# Patient Record
Sex: MALE | Race: WHITE | NOT HISPANIC OR LATINO | Employment: FULL TIME | URBAN - METROPOLITAN AREA
[De-identification: names, ages, dates, MRNs, and addresses within clinical notes are randomized per-mention and may not be internally consistent; named-entity substitution may affect disease eponyms.]

---

## 2017-01-27 ENCOUNTER — ALLSCRIPTS OFFICE VISIT (OUTPATIENT)
Dept: OTHER | Facility: OTHER | Age: 22
End: 2017-01-27

## 2018-01-14 VITALS
BODY MASS INDEX: 21.82 KG/M2 | DIASTOLIC BLOOD PRESSURE: 74 MMHG | WEIGHT: 144 LBS | SYSTOLIC BLOOD PRESSURE: 112 MMHG | HEIGHT: 68 IN | HEART RATE: 51 BPM | OXYGEN SATURATION: 98 %

## 2018-07-30 ENCOUNTER — OFFICE VISIT (OUTPATIENT)
Dept: FAMILY MEDICINE CLINIC | Facility: CLINIC | Age: 23
End: 2018-07-30
Payer: COMMERCIAL

## 2018-07-30 VITALS
BODY MASS INDEX: 20.92 KG/M2 | HEIGHT: 68 IN | OXYGEN SATURATION: 98 % | SYSTOLIC BLOOD PRESSURE: 112 MMHG | TEMPERATURE: 98.1 F | HEART RATE: 64 BPM | DIASTOLIC BLOOD PRESSURE: 70 MMHG | WEIGHT: 138 LBS

## 2018-07-30 DIAGNOSIS — L73.9 FOLLICULITIS: Primary | ICD-10-CM

## 2018-07-30 PROCEDURE — 99213 OFFICE O/P EST LOW 20 MIN: CPT | Performed by: FAMILY MEDICINE

## 2018-07-30 RX ORDER — MUPIROCIN CALCIUM 20 MG/G
CREAM TOPICAL 3 TIMES DAILY
Qty: 15 G | Refills: 1 | Status: SHIPPED | OUTPATIENT
Start: 2018-07-30 | End: 2018-10-11

## 2018-07-30 RX ORDER — SULFAMETHOXAZOLE AND TRIMETHOPRIM 800; 160 MG/1; MG/1
1 TABLET ORAL 2 TIMES DAILY
Qty: 20 TABLET | Refills: 0 | Status: SHIPPED | OUTPATIENT
Start: 2018-07-30 | End: 2018-08-10 | Stop reason: SDUPTHER

## 2018-07-30 NOTE — PROGRESS NOTES
Assessment/Plan:           Problem List Items Addressed This Visit     Folliculitis - Primary    Relevant Medications    sulfamethoxazole-trimethoprim (BACTRIM DS) 800-160 mg per tablet    mupirocin (BACTROBAN) 2 % cream    Other Relevant Orders    Lyme Antibody Profile with reflex to WB            Subjective:      Patient ID: Erasmo Hendricks is a 25 y o  male  HPI    The following portions of the patient's history were reviewed and updated as appropriate: allergies, current medications, past family history, past medical history, past social history, past surgical history and problem list     Review of Systems   Constitutional: Negative  HENT: Negative  Respiratory: Negative  Cardiovascular: Negative            Objective:      /70   Pulse 64   Temp 98 1 °F (36 7 °C)   Ht 5' 8" (1 727 m)   Wt 62 6 kg (138 lb)   SpO2 98%   BMI 20 98 kg/m²          Physical Exam   Skin:   Red macule left shin with multiple surrounding pustules extending up his leg

## 2018-08-10 ENCOUNTER — OFFICE VISIT (OUTPATIENT)
Dept: FAMILY MEDICINE CLINIC | Facility: CLINIC | Age: 23
End: 2018-08-10
Payer: COMMERCIAL

## 2018-08-10 VITALS
BODY MASS INDEX: 20.76 KG/M2 | OXYGEN SATURATION: 98 % | SYSTOLIC BLOOD PRESSURE: 118 MMHG | HEIGHT: 68 IN | RESPIRATION RATE: 17 BRPM | DIASTOLIC BLOOD PRESSURE: 68 MMHG | HEART RATE: 61 BPM | TEMPERATURE: 98.2 F | WEIGHT: 137 LBS

## 2018-08-10 DIAGNOSIS — L73.9 FOLLICULITIS: ICD-10-CM

## 2018-08-10 PROCEDURE — 99213 OFFICE O/P EST LOW 20 MIN: CPT | Performed by: FAMILY MEDICINE

## 2018-08-10 PROCEDURE — 3008F BODY MASS INDEX DOCD: CPT | Performed by: FAMILY MEDICINE

## 2018-08-10 RX ORDER — SULFAMETHOXAZOLE AND TRIMETHOPRIM 800; 160 MG/1; MG/1
1 TABLET ORAL 2 TIMES DAILY
Qty: 20 TABLET | Refills: 0 | Status: SHIPPED | OUTPATIENT
Start: 2018-08-10 | End: 2018-08-20

## 2018-08-10 NOTE — PROGRESS NOTES
Assessment/Plan:           Problem List Items Addressed This Visit     Folliculitis    Relevant Medications    sulfamethoxazole-trimethoprim (BACTRIM DS) 800-160 mg per tablet            Subjective:      Patient ID: Analilia Grimes is a 25 y o  male  Patient comes in for a recheck  Folliculitis on his left leg is much improved  The following portions of the patient's history were reviewed and updated as appropriate: allergies, current medications, past family history, past medical history, past social history, past surgical history and problem list     Review of Systems   Constitutional: Negative  HENT: Negative  Respiratory: Negative  Cardiovascular: Negative            Objective:      /68   Pulse 61   Temp 98 2 °F (36 8 °C)   Resp 17   Ht 5' 8" (1 727 m)   Wt 62 1 kg (137 lb)   SpO2 98%   BMI 20 83 kg/m²          Physical Exam   Skin:   Inflamed hair follicles left leg no pustules

## 2018-10-11 ENCOUNTER — OFFICE VISIT (OUTPATIENT)
Dept: FAMILY MEDICINE CLINIC | Facility: CLINIC | Age: 23
End: 2018-10-11
Payer: COMMERCIAL

## 2018-10-11 VITALS
HEART RATE: 53 BPM | HEIGHT: 68 IN | TEMPERATURE: 98.4 F | OXYGEN SATURATION: 99 % | WEIGHT: 138 LBS | RESPIRATION RATE: 18 BRPM | SYSTOLIC BLOOD PRESSURE: 102 MMHG | BODY MASS INDEX: 20.92 KG/M2 | DIASTOLIC BLOOD PRESSURE: 62 MMHG

## 2018-10-11 DIAGNOSIS — M25.561 ACUTE PAIN OF RIGHT KNEE: Primary | ICD-10-CM

## 2018-10-11 PROBLEM — L73.9 FOLLICULITIS: Status: RESOLVED | Noted: 2018-07-30 | Resolved: 2018-10-11

## 2018-10-11 PROCEDURE — 99213 OFFICE O/P EST LOW 20 MIN: CPT | Performed by: NURSE PRACTITIONER

## 2018-10-11 NOTE — ASSESSMENT & PLAN NOTE
To obtain MRI, will call with results  Advised to avoid aggravating activities such as running  May try over-the-counter ibuprofen for pain relief  To begin physical therapy  Follow-up after obtaining MRI

## 2018-10-11 NOTE — PROGRESS NOTES
Assessment/Plan:    Acute pain of right knee  To obtain MRI, will call with results  Advised to avoid aggravating activities such as running  May try over-the-counter ibuprofen for pain relief  To begin physical therapy  Follow-up after obtaining MRI  Diagnoses and all orders for this visit:    Acute pain of right knee  -     MRI knee right  wo contrast; Future  -     Ambulatory referral to Physical Therapy; Future          Subjective:      Patient ID: Rupesh Peng is a 26 y o  male  Chris Bonds presents reporting right knee pain for the past 2 weeks  It started while he was on a 6 mile run  He continued to run and the pain worsened  He is a runner and will often run this distance  He feels as though his knee is going to buckle when he tries to squat and there is also a popping sensation  Denies swelling, recent trauma, or prior surgery on knee  Walking or running will worsen the pain in rest and ice will provide some relief  The following portions of the patient's history were reviewed and updated as appropriate: He   Patient Active Problem List    Diagnosis Date Noted    Acute pain of right knee 10/11/2018     No current outpatient prescriptions on file  No current facility-administered medications for this visit  He is allergic to penicillins and pertussis vaccine       Review of Systems   Constitutional: Negative  Respiratory: Negative  Cardiovascular: Negative  Musculoskeletal:        Knee pain   Neurological: Negative  Psychiatric/Behavioral: Negative  /62   Pulse (!) 53   Temp 98 4 °F (36 9 °C)   Resp 18   Ht 5' 8" (1 727 m)   Wt 62 6 kg (138 lb)   SpO2 99%   BMI 20 98 kg/m²     Objective:     Physical Exam   Constitutional: He is oriented to person, place, and time  He appears well-developed and well-nourished  HENT:   Head: Normocephalic and atraumatic  Eyes: Conjunctivae are normal    Neck: Neck supple     Cardiovascular: Normal rate, regular rhythm and normal heart sounds  No murmur heard  Pulmonary/Chest: Effort normal and breath sounds normal  No respiratory distress  He has no wheezes  He has no rales  He exhibits no tenderness  Musculoskeletal:   Presence of tenderness in medial aspect of right knee  Full ROM intact  Pain with flexion  Neurological: He is alert and oriented to person, place, and time  Psychiatric: He has a normal mood and affect   His behavior is normal  Judgment and thought content normal

## 2018-10-18 ENCOUNTER — TELEPHONE (OUTPATIENT)
Dept: FAMILY MEDICINE CLINIC | Facility: CLINIC | Age: 23
End: 2018-10-18

## 2018-10-18 NOTE — TELEPHONE ENCOUNTER
Please call patient to make aware that his MRI is denied  Please ensure that he proceeds with physical therapy and follow-up after completion of PT if the pain is not resolved    Thank you

## 2018-10-18 NOTE — TELEPHONE ENCOUNTER
----- Message from Gloria Hines sent at 10/18/2018  8:01 AM EDT -----  Regarding: Peer to Peer  Good Morning Galdino Zayas's MRI of his rt knee is pending denial  They are offering peer to peer at [phone], case number [id number]  Please let me know the outcome of the peer to peer or have your office contact the patient with a new plan of care      Thank you,  Isabel Phipps

## 2018-10-26 ENCOUNTER — EVALUATION (OUTPATIENT)
Dept: PHYSICAL THERAPY | Facility: CLINIC | Age: 23
End: 2018-10-26
Payer: COMMERCIAL

## 2018-10-26 DIAGNOSIS — M25.561 ACUTE PAIN OF RIGHT KNEE: Primary | ICD-10-CM

## 2018-10-26 PROCEDURE — G8979 MOBILITY GOAL STATUS: HCPCS | Performed by: PHYSICAL THERAPIST

## 2018-10-26 PROCEDURE — G8978 MOBILITY CURRENT STATUS: HCPCS | Performed by: PHYSICAL THERAPIST

## 2018-10-26 PROCEDURE — 97161 PT EVAL LOW COMPLEX 20 MIN: CPT | Performed by: PHYSICAL THERAPIST

## 2018-10-26 PROCEDURE — 97110 THERAPEUTIC EXERCISES: CPT | Performed by: PHYSICAL THERAPIST

## 2018-10-26 NOTE — PROGRESS NOTES
PT Evaluation  and PT Discharge    Today's date: 10/26/2018  Patient name: Obdulia Silva  : 1995  MRN: [id number]  Referring provider: MARY Lackey  Dx:   Encounter Diagnosis     ICD-10-CM    1  Acute pain of right knee M25 [id number] Ambulatory referral to Physical Therapy       Start Time: 1100  Stop Time: 1150  Total time in clinic (min): 50 minutes    Assessment  Assessment details: Pt is a 25 y/o male presenting to physical therapy with chief complaint of R knee pain that began about 1-2 months ago and got worse in the past 2-3 weeks  Pt presents with WNL R knee A/PROM, WNL hip and knee strength  Pt has (-) meniscal and ligamentous testing, however has increased pain with LAQ and SLR when combined with ER  This in combination with TTP at the R VMO and a decreased ability to eccentrically lower on the RLE suggest a strain of the R VMO  Pt has minimal gait abnormalities during running that may have irritated this injury  Pt would benefit from physical therapy in order to decrease pain, improve concentric and eccentric strength, improve running biomechanics, and return to PLOF recreational activity without pain  18: Pt has not returned to physical therapy since IE and has not called to make further appts  Subjective and objective information unable to be updated at this time  Impairments: abnormal gait, abnormal or restricted ROM, activity intolerance, impaired balance, impaired physical strength and pain with function  Functional limitations: running, weight lifting  Symptom irritability: moderateUnderstanding of Dx/Px/POC: good   Prognosis: good  Prognosis details: STG: 3 weeks  NOT MET  1  Pt will demonstrate independence with HEP  2  Pt will report decreased TTP over the R VMO  3  Pt will be able to run 2 miles without usual R knee pain  LT weeks  NOT MET  1  Pt will be able to eccentrically lower from 6" step without compensations to improve stair negotiation  2   Pt will be able to run 6 miles without R knee pain  3  Pt will have no TTP over the R VMO  4  Pt will demonstrate no abnormalities of running biomechanics      Plan  Patient would benefit from: skilled physical therapy  Planned modality interventions: cryotherapy and thermotherapy: hydrocollator packs  Planned therapy interventions: therapeutic exercise, therapeutic activities, stretching, strengthening, patient education, neuromuscular re-education, massage, manual therapy, balance, gait training and home exercise program        Subjective Evaluation    History of Present Illness  Mechanism of injury: Pt reports this pain in his R knee has been going on for about 2 months  Pt reports about 2-3 weeks ago the pain increased  Pt reports he can now pinpoint the pain to the inside of the knee  Pt reports he is unable to run 2 miles now without pain  Pt reports he always feels the pain  Pt reports he hikes and rock climbs, which he is able to do but with less intensity  Pt reports when it first happened his knee felt unstable, but now it is feeling better  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  Pt reports biking and stretching make it feel better  Pt reports past pain with going down the stairs and squatting    Pain  Current pain rating: 3  At best pain ratin  At worst pain ratin  Quality: sharp and discomfort    Patient Goals  Patient goals for therapy: return to sport/leisure activities, decreased pain and increased strength          Objective     Tenderness     Additional Tenderness Details  TTP at R VMO    Active Range of Motion   Left Knee   Flexion: 141 degrees   Extension: 0 degrees   Extensor la degrees     Right Knee   Flexion: 138 degrees   Extension: 0 degrees   Extensor la degrees     Strength/Myotome Testing     Right Hip   Planes of Motion   Extension: 4+  Abduction: 4+    Left Knee   Flexion: 4  Extension: 4    Right Knee   Flexion: 4  Extension: 4    Additional Strength Details  Knee ext with ER:   R: 4-/5, pain  L: 4/5, no pain    Tests     Right Hip   Negative Dar  Right Knee   Negative anterior drawer, Apley's compression, Apley's distraction, lateral Divine, medial Divine, posterior drawer, valgus stress test at 30 degrees and varus stress test at 30 degrees  Additional Tests Details  SLR with ER: painful on the R    Swelling     Left Knee Girth Measurement (cm)   Joint line: 32 cm    Right Knee Girth Measurement (cm)   Joint line: 32 cm    Functional Assessment   Squat   Left within functional limits and right within functional limits  Forward Step Down 6"   Left Leg  Within functional limits  Right Leg  Pain and valgus       Comments  Running on treadmill: pt runs with forefoot striking bilaterally, minimal R knee valgus, occasional crossing of midline of the RLE      Flowsheet Rows      Most Recent Value   PT/OT G-Codes   Current Score  58   Projected Score  79   FOTO information reviewed  Yes   Assessment Type  Evaluation   G code set  Mobility: Walking & Moving Around   Mobility: Walking and Moving Around Current Status ()  CK   Mobility: Walking and Moving Around Goal Status ()  CI

## 2018-10-30 ENCOUNTER — APPOINTMENT (OUTPATIENT)
Dept: PHYSICAL THERAPY | Facility: CLINIC | Age: 23
End: 2018-10-30
Payer: COMMERCIAL

## 2018-11-27 PROCEDURE — G8979 MOBILITY GOAL STATUS: HCPCS | Performed by: PHYSICAL THERAPIST

## 2018-11-27 PROCEDURE — G8980 MOBILITY D/C STATUS: HCPCS | Performed by: PHYSICAL THERAPIST

## 2019-05-06 ENCOUNTER — TELEPHONE (OUTPATIENT)
Dept: FAMILY MEDICINE CLINIC | Facility: CLINIC | Age: 24
End: 2019-05-06

## 2019-05-06 DIAGNOSIS — M25.50 ARTHRALGIA, UNSPECIFIED JOINT: Primary | ICD-10-CM

## 2020-01-02 ENCOUNTER — OFFICE VISIT (OUTPATIENT)
Dept: FAMILY MEDICINE CLINIC | Facility: CLINIC | Age: 25
End: 2020-01-02
Payer: COMMERCIAL

## 2020-01-02 VITALS
OXYGEN SATURATION: 98 % | TEMPERATURE: 98.8 F | BODY MASS INDEX: 22.05 KG/M2 | WEIGHT: 145 LBS | SYSTOLIC BLOOD PRESSURE: 106 MMHG | HEART RATE: 70 BPM | DIASTOLIC BLOOD PRESSURE: 64 MMHG

## 2020-01-02 DIAGNOSIS — H65.01 RIGHT ACUTE SEROUS OTITIS MEDIA, RECURRENCE NOT SPECIFIED: Primary | ICD-10-CM

## 2020-01-02 PROBLEM — M25.561 ACUTE PAIN OF RIGHT KNEE: Status: RESOLVED | Noted: 2018-10-11 | Resolved: 2020-01-02

## 2020-01-02 PROCEDURE — 1036F TOBACCO NON-USER: CPT | Performed by: PHYSICIAN ASSISTANT

## 2020-01-02 PROCEDURE — 99213 OFFICE O/P EST LOW 20 MIN: CPT | Performed by: PHYSICIAN ASSISTANT

## 2020-01-02 RX ORDER — DESONIDE 0.5 MG/G
CREAM TOPICAL
COMMUNITY
End: 2020-01-02 | Stop reason: ALTCHOICE

## 2020-01-02 RX ORDER — PROMETHAZINE HYDROCHLORIDE 25 MG/1
TABLET ORAL
COMMUNITY
End: 2020-01-02 | Stop reason: ALTCHOICE

## 2020-01-02 RX ORDER — AZITHROMYCIN 250 MG/1
TABLET, FILM COATED ORAL
Qty: 6 TABLET | Refills: 0 | Status: SHIPPED | OUTPATIENT
Start: 2020-01-02 | End: 2020-01-06

## 2020-01-02 RX ORDER — ISOTRETINOIN 40 MG/1
CAPSULE ORAL
COMMUNITY
End: 2020-01-02 | Stop reason: ALTCHOICE

## 2020-01-02 RX ORDER — CEFUROXIME AXETIL 500 MG/1
TABLET ORAL
COMMUNITY
End: 2020-01-02 | Stop reason: ALTCHOICE

## 2020-01-02 RX ORDER — MELOXICAM 15 MG/1
TABLET ORAL
COMMUNITY
End: 2020-01-02 | Stop reason: ALTCHOICE

## 2020-01-02 RX ORDER — AZITHROMYCIN 250 MG/1
TABLET, FILM COATED ORAL
COMMUNITY
End: 2020-01-02 | Stop reason: ALTCHOICE

## 2020-01-02 RX ORDER — ALBUTEROL SULFATE 90 UG/1
AEROSOL, METERED RESPIRATORY (INHALATION)
COMMUNITY
End: 2020-01-02 | Stop reason: SDDI

## 2020-01-02 NOTE — PROGRESS NOTES
Assessment/Plan:     Right acute serous otitis media, recurrence not specified   Patient is to take a Zithromax Z-Giuseppe which is sent to the pharmacy  He is to continue taking DayQuil and to add saline nasal spray to help with his congestion and cough  Subjective:      Patient ID: Shanthi Gipson is a 22 y o  male  Cough   This is a new problem  The current episode started in the past 7 days  The problem has been waxing and waning  The problem occurs every few minutes  The cough is productive of purulent sputum  Associated symptoms include chest pain, chills, ear congestion, ear pain, a fever, headaches, myalgias, nasal congestion, postnasal drip and a sore throat  Pertinent negatives include no rash, rhinorrhea, shortness of breath or wheezing  The symptoms are aggravated by lying down  He has tried body position changes, OTC cough suppressant and rest for the symptoms  The treatment provided moderate relief  His past medical history is significant for asthma and environmental allergies  Earache    There is pain in both ears  This is a new problem  The current episode started in the past 7 days  The problem occurs every few minutes  The problem has been waxing and waning  The maximum temperature recorded prior to his arrival was 102 - 102 9 F  The fever has been present for 1 to 2 days  The pain is moderate  Associated symptoms include coughing, headaches and a sore throat  Pertinent negatives include no abdominal pain, diarrhea, ear discharge, hearing loss, neck pain, rash, rhinorrhea or vomiting  He has tried acetaminophen for the symptoms  The treatment provided mild relief  Headache    This is a new problem  The current episode started in the past 7 days  The problem occurs intermittently  The problem has been waxing and waning  The pain is located in the bilateral region  The pain does not radiate  The pain quality is similar to prior headaches  The quality of the pain is described as aching   The pain is mild  Associated symptoms include coughing, ear pain, a fever, muscle aches and a sore throat  Pertinent negatives include no abdominal pain, blurred vision, dizziness, eye pain, hearing loss, nausea, neck pain, rhinorrhea, sinus pressure, tinnitus or vomiting  The symptoms are aggravated by coughing  He has tried acetaminophen for the symptoms  The treatment provided moderate relief  Fever   This is a new problem  The current episode started in the past 7 days  The problem occurs 2 to 4 times per day  The problem has been resolved  Associated symptoms include chest pain, chills, congestion, coughing, fatigue, a fever, headaches, myalgias and a sore throat  Pertinent negatives include no abdominal pain, nausea, neck pain, rash or vomiting  Nothing aggravates the symptoms  He has tried NSAIDs for the symptoms  The treatment provided significant relief  The following portions of the patient's history were reviewed and updated as appropriate:   He has a past medical history of Allergic and Asthma ,  does not have any pertinent problems on file  ,   has a past surgical history that includes No past surgeries and Appendectomy  ,  family history includes Allergies in his sister; Hypertension in his father; Lymphoma in his mother  ,   reports that he has never smoked  He has never used smokeless tobacco  He reports that he does not drink alcohol or use drugs  ,  is allergic to penicillins and pertussis vaccine     Current Outpatient Medications   Medication Sig Dispense Refill    azithromycin (ZITHROMAX) 250 mg tablet Take 2 tablets today then 1 tablet daily x 4 days 6 tablet 0     No current facility-administered medications for this visit  Review of Systems   Constitutional: Positive for activity change, chills, fatigue and fever  Negative for appetite change  HENT: Positive for congestion, ear pain, postnasal drip and sore throat   Negative for ear discharge, hearing loss, rhinorrhea, sinus pressure, sneezing and tinnitus  Eyes: Negative for blurred vision and pain  Respiratory: Positive for cough  Negative for chest tightness, shortness of breath and wheezing  Cardiovascular: Positive for chest pain  Gastrointestinal: Negative for abdominal pain, diarrhea, nausea and vomiting  Musculoskeletal: Positive for myalgias  Negative for neck pain  Skin: Negative for rash  Allergic/Immunologic: Positive for environmental allergies  Neurological: Positive for headaches  Negative for dizziness and light-headedness  Psychiatric/Behavioral: Negative  Objective:  Vitals:    01/02/20 1413   BP: 106/64   Pulse: 70   Temp: 98 8 °F (37 1 °C)   SpO2: 98%   Weight: 65 8 kg (145 lb)     Body mass index is 22 05 kg/m²  Physical Exam   Constitutional: He is oriented to person, place, and time  He appears well-developed and well-nourished  HENT:   Head: Normocephalic  Right Ear: Hearing, external ear and ear canal normal  Tympanic membrane is injected  A middle ear effusion is present  Left Ear: Hearing, tympanic membrane, external ear and ear canal normal    Nose: Mucosal edema present  Mouth/Throat: Uvula is midline, oropharynx is clear and moist and mucous membranes are normal  No uvula swelling  No oropharyngeal exudate, posterior oropharyngeal edema or posterior oropharyngeal erythema  Eyes: Pupils are equal, round, and reactive to light  Conjunctivae are normal    Neck: Normal range of motion  Cardiovascular: Normal rate, regular rhythm, normal heart sounds and intact distal pulses  Pulmonary/Chest: Effort normal and breath sounds normal    Lymphadenopathy:     He has no cervical adenopathy  Neurological: He is alert and oriented to person, place, and time  He has normal reflexes  Skin: Skin is warm and dry  Psychiatric: He has a normal mood and affect  His behavior is normal  Judgment and thought content normal    Nursing note and vitals reviewed